# Patient Record
(demographics unavailable — no encounter records)

---

## 2024-12-24 NOTE — ASSESSMENT
[Verbal] : Verbal [Demo] : Demo [Patient] : Patient [Caregiver] : Caregiver [Good - alert, interested, motivated] : Good - alert, interested, motivated [Verbalizes knowledge/Understanding] : Verbalizes knowledge/understanding [Dressing changes] : dressing changes [Foot Care] : foot care [Skin Care] : skin care [Pressure relief] : pressure relief [Signs and symptoms of infection] : sign and symptoms of infection [Nutrition] : nutrition [How and When to Call] : how and when to call [Pain Management] : pain management [Patient responsibility to plan of care] : patient responsibility to plan of care [] : Yes [Stable] : stable [LTC] : LTC [Wheelchair] : Wheelchair [Faxed - Long Term Care/Home Health Agency] : Long Term Care/Home Health Agency: Faxed [FreeTextEntry2] : Infection prevention Localized wound care  Goal remaining pain free regarding wounds Pressure relief  Promote optimal skin care and nutrition.  [FreeTextEntry4] : Pt to continue to use heel booties.  Instructions given to accompanied caregiver   follow up in 4 weeks

## 2024-12-24 NOTE — VITALS
[Pain related to present condition?] : The patient's  pain is not related to present condition. [] : No [de-identified] : Patient complains of no pain.

## 2024-12-24 NOTE — PHYSICAL EXAM
[4 x 4] : 4 x 4  [0] : left 0 [1+] : left 1+ [Ankle Swelling (On Exam)] : present [Ankle Swelling Bilaterally] : bilaterally  [Ankle Swelling On The Left] : moderate [Varicose Veins Of Lower Extremities] : bilaterally [Ankle Swelling On The Right] : mild [Skin Ulcer] : ulcer [Calm] : calm [] : not present [Purpura] : no purpura  [Petechiae] : no petechiae [Alert] : not alert [Oriented to Person] : disoriented to person [Oriented to Place] : disoriented to place [Oriented to Time] : disoriented to time [de-identified] : calm , accompanied by aid ,  [de-identified] : TRISTIAN [de-identified] : patient non ambulatory , contracted, range of motion deferred at this time to the ankle joint [de-identified] : Bilateral heel ulcers healed [de-identified] : Dementia [FreeTextEntry1] : left plantar heel  [FreeTextEntry2] : 1.0 [FreeTextEntry3] : 1.0 [FreeTextEntry4] : 0.1 [de-identified] : Callus  [de-identified] : Silver Alginate [FreeTextEntry7] : Right lateral heel - closed  [de-identified] : Mechanically cleansed with sterile gauze and normal saline. Martin joseph   [de-identified] : Mechanically cleansed with sterile gauze and normal saline. White sock  [de-identified] : right buttocks (Pressure) - SEE MD NOTE [TWNoteComboBox4] : None [TWNoteComboBox5] : No [TWNoteComboBox6] : Pressure [de-identified] : No [de-identified] : other [de-identified] : None [de-identified] : None [de-identified] : 100% [de-identified] : No [de-identified] : 3x Weekly [de-identified] : Primary Dressing [de-identified] : 1 [de-identified] : None [de-identified] : No [de-identified] : Pressure [de-identified] : No [de-identified] : Erythema [de-identified] : None [de-identified] : False

## 2024-12-24 NOTE — REVIEW OF SYSTEMS
[Joint Stiffness] : joint stiffness [Skin Wound] : skin wound [Limb Weakness] : limb weakness [Difficulty Walking] : difficulty walking [Negative] : Psychiatric [Fever] : no fever [Chills] : no chills [Eye Pain] : no eye pain [Shortness Of Breath] : no shortness of breath [Abdominal Pain] : no abdominal pain [FreeTextEntry4] : Dementia  [FreeTextEntry5] : TRISTIAN [de-identified] : Pressure ulcer medial left heel and lateral right heel  , no soi , right closed , left is much smaller  [de-identified] : Dementia , patient non ambulatory  [de-identified] : Dementia

## 2024-12-24 NOTE — ASSESSMENT
[Verbal] : Verbal [Demo] : Demo [Patient] : Patient [Caregiver] : Caregiver [Good - alert, interested, motivated] : Good - alert, interested, motivated [Verbalizes knowledge/Understanding] : Verbalizes knowledge/understanding [Dressing changes] : dressing changes [Foot Care] : foot care [Skin Care] : skin care [Pressure relief] : pressure relief [Signs and symptoms of infection] : sign and symptoms of infection [Nutrition] : nutrition [How and When to Call] : how and when to call [Pain Management] : pain management [Patient responsibility to plan of care] : patient responsibility to plan of care [] : Yes [Stable] : stable [LTC] : LTC [Wheelchair] : Wheelchair [Not Applicable - Long Term Care/Home Health Agency] : Long Term Care/Home Health Agency: Not Applicable [FreeTextEntry2] : Infection prevention Localized wound care  Goal remaining pain free regarding wounds Pressure relief  Promote optimal skin care and nutrition.  [FreeTextEntry4] : Pt to continue to use heel booties.  Instructions given to accompanied caregiver   follow up in 2 weeks   Patient unable to perform own dressing changes and has limited mobility. Patient unable to drive to [facility]. Patient is wheelchair dependent, leaving home is a taxing effort. If patient is unable to perform dressing changes, it can lead to increased risk of infection. Patient has limited assistance for dressings at assisted living facility.

## 2024-12-24 NOTE — VITALS
I have reviewed discharge instructions with the patient. The patient verbalized understanding.   Patient armband removed and shredded [Pain related to present condition?] : The patient's  pain is related to present condition. [] : No [de-identified] : Patient complains of ankle pain. [FreeTextEntry3] : Left ankle [FreeTextEntry2] : Movement, Pressure [FreeTextEntry1] : Offloading, Keeping it still. [FreeTextEntry4] : Ankle supported with heel boot

## 2024-12-24 NOTE — PHYSICAL EXAM
[Normal Thyroid] : the thyroid was normal [Normal Breath Sounds] : Normal breath sounds [Normal Heart Sounds] : normal heart sounds [Normal Rate and Rhythm] : normal rate and rhythm [Alert] : alert [Calm] : calm [de-identified] : right buttocks (2 Wounds within measurements) [de-identified] : 5.2 [de-identified] : 4.9 [de-identified] : 0.1 [de-identified] : excoriation  [de-identified] : allevyn Ag pad [de-identified] : Mechanically cleansed with sterile gauze and normal saline. Cloth tape  [de-identified] : 2 [de-identified] : Moderate [de-identified] : No [de-identified] : Pressure [de-identified] : No [de-identified] : Erythema [de-identified] : None [de-identified] : None [de-identified] : 100% [de-identified] : No [de-identified] : 3x Weekly [de-identified] : No [de-identified] : None [de-identified] : 100% [de-identified] : 3x Weekly [JVD] : no jugular venous distention  [Abdomen Masses] : No abdominal massess [Abdomen Tenderness] : ~T ~M No abdominal tenderness [Tender] : nontender [Enlarged] : not enlarged [de-identified] : elderly WF, NAD, alert, awake [FreeTextEntry1] : right buttocks (2 Wounds within measurements) [FreeTextEntry2] : 5.2 [FreeTextEntry4] : 0.1 [FreeTextEntry3] : 4.9 [de-identified] : excoriation  [de-identified] : allevyn  pad [de-identified] : Mechanically cleansed with sterile gauze and normal saline. Cloth tape  [FreeTextEntry7] : Left buttocks  [FreeTextEntry8] : 4.7 [FreeTextEntry9] : 4.2 [de-identified] : Excoriated [de-identified] : excoriation  [de-identified] : allevyn  pad [de-identified] :  Mechanically cleansed with sterile gauze and normal saline 0.9%.  Cloth tape [TWNoteComboBox2] : 2 [TWNoteComboBox4] : Moderate [TWNoteComboBox5] : No [TWNoteComboBox6] : Pressure [de-identified] : No [de-identified] : Erythema [de-identified] : None [de-identified] : None [de-identified] : 100% [de-identified] : No [de-identified] : 3x Weekly [de-identified] : False [de-identified] : 2 [de-identified] : Moderate [de-identified] : No [de-identified] : Pressure [de-identified] : No [de-identified] : Erythema [de-identified] : None [de-identified] : None [de-identified] : 100% [de-identified] : No [de-identified] : 3x Weekly [de-identified] : Primary Dressing [de-identified] : Primary Dressing

## 2024-12-24 NOTE — VITALS
[Pain related to present condition?] : The patient's  pain is related to present condition. [] : No [de-identified] : Patient complains of ankle pain. [FreeTextEntry3] : Left ankle [FreeTextEntry1] : Offloading, Keeping it still. [FreeTextEntry2] : Movement, Pressure [FreeTextEntry4] : Ankle supported with heel boot

## 2024-12-24 NOTE — ASSESSMENT
[Verbal] : Verbal [Demo] : Demo [Patient] : Patient [Caregiver] : Caregiver [Good - alert, interested, motivated] : Good - alert, interested, motivated [Verbalizes knowledge/Understanding] : Verbalizes knowledge/understanding [Dressing changes] : dressing changes [Foot Care] : foot care [Skin Care] : skin care [Pressure relief] : pressure relief [Signs and symptoms of infection] : sign and symptoms of infection [Nutrition] : nutrition [How and When to Call] : how and when to call [Pain Management] : pain management [Patient responsibility to plan of care] : patient responsibility to plan of care [] : Yes [Stable] : stable [LTC] : LTC [Wheelchair] : Wheelchair [Not Applicable - Long Term Care/Home Health Agency] : Long Term Care/Home Health Agency: Not Applicable [FreeTextEntry2] : Infection prevention Localized wound care  Goal remaining pain free regarding wounds Pressure relief  Promote optimal skin care and nutrition.  [FreeTextEntry4] : Pt to continue to use heel booties.  Instructions given to accompanied caregiver   follow up in 2 weeks   Patient unable to perform own dressing changes and has limited mobility. Patient unable to drive to [facility]. Patient is wheelchair dependent, leaving home is a taxing effort. If patient is unable to perform dressing changes, it can lead to increased risk of infection. Patient has limited assistance for dressings at assisted living facility.

## 2024-12-24 NOTE — VITALS
[Pain related to present condition?] : The patient's  pain is not related to present condition. [] : No [de-identified] : Patient complains of no pain.

## 2024-12-24 NOTE — REVIEW OF SYSTEMS
[Joint Stiffness] : joint stiffness [Limb Weakness] : limb weakness [Difficulty Walking] : difficulty walking [Negative] : Endocrine [Fever] : no fever [Chills] : no chills [Eye Pain] : no eye pain [Shortness Of Breath] : no shortness of breath [Abdominal Pain] : no abdominal pain [Easy Bleeding] : no tendency for easy bleeding [FreeTextEntry4] : Dementia  [FreeTextEntry5] : TRISTIAN [de-identified] : Pressure ulcer medial left heel and lateral right heel ( closed )   , no soi , smaller and clean  [de-identified] : Dementia , patient non ambulatory  [de-identified] : Dementia

## 2024-12-24 NOTE — PLAN
[FreeTextEntry1] : offload; frequent rotation/change of position cristi whitlock DD qod f/u 1 month  time spent 25 mins.

## 2024-12-24 NOTE — REVIEW OF SYSTEMS
[Joint Stiffness] : joint stiffness [Limb Weakness] : limb weakness [Difficulty Walking] : difficulty walking [Negative] : Endocrine [Fever] : no fever [Chills] : no chills [Eye Pain] : no eye pain [Shortness Of Breath] : no shortness of breath [Abdominal Pain] : no abdominal pain [Easy Bleeding] : no tendency for easy bleeding [FreeTextEntry4] : Dementia  [FreeTextEntry5] : TRISTIAN [de-identified] : Pressure ulcer medial left heel and lateral right heel ( closed )   , no soi , smaller and clean  [de-identified] : Dementia , patient non ambulatory  [de-identified] : Dementia

## 2024-12-24 NOTE — HISTORY OF PRESENT ILLNESS
[FreeTextEntry1] : Right heel ulcer remains closed.  Patient has not followed up with orthopedic since discharge from the hospital.

## 2024-12-24 NOTE — PHYSICAL EXAM
[4 x 4] : 4 x 4  [0] : left 0 [1+] : left 1+ [Ankle Swelling (On Exam)] : present [Ankle Swelling Bilaterally] : bilaterally  [Ankle Swelling On The Left] : moderate [Varicose Veins Of Lower Extremities] : bilaterally [Ankle Swelling On The Right] : mild [Skin Ulcer] : ulcer [Calm] : calm [] : not present [Purpura] : no purpura  [Petechiae] : no petechiae [Alert] : not alert [Oriented to Person] : disoriented to person [Oriented to Place] : disoriented to place [Oriented to Time] : disoriented to time [de-identified] : calm , accompanied by aid ,  [de-identified] : TRISTIAN [de-identified] : patient non ambulatory , contracted, range of motion deferred at this time to the ankle joint [de-identified] : Bilateral heel ulcers healed [de-identified] : Dementia [FreeTextEntry1] : left plantar heel  [FreeTextEntry2] : 1.0 [FreeTextEntry3] : 1.0 [FreeTextEntry4] : 0.1 [de-identified] : Callus  [de-identified] : Silver Alginate [FreeTextEntry7] : Right lateral heel - closed  [de-identified] : Mechanically cleansed with sterile gauze and normal saline. Martin joseph   [de-identified] : Mechanically cleansed with sterile gauze and normal saline. White sock  [de-identified] : right buttocks (Pressure) - SEE MD NOTE [TWNoteComboBox4] : None [TWNoteComboBox5] : No [TWNoteComboBox6] : Pressure [de-identified] : No [de-identified] : other [de-identified] : None [de-identified] : None [de-identified] : 100% [de-identified] : No [de-identified] : 3x Weekly [de-identified] : Primary Dressing [de-identified] : 1 [de-identified] : None [de-identified] : No [de-identified] : Pressure [de-identified] : No [de-identified] : Erythema [de-identified] : None [de-identified] : False

## 2024-12-24 NOTE — PHYSICAL EXAM
[Normal Thyroid] : the thyroid was normal [Normal Breath Sounds] : Normal breath sounds [Normal Heart Sounds] : normal heart sounds [Normal Rate and Rhythm] : normal rate and rhythm [Alert] : alert [Calm] : calm [de-identified] : right buttocks (2 Wounds within measurements) [de-identified] : 5.2 [de-identified] : 4.9 [de-identified] : 0.1 [de-identified] : excoriation  [de-identified] : allevyn Ag pad [de-identified] : Mechanically cleansed with sterile gauze and normal saline. Cloth tape  [de-identified] : 2 [de-identified] : Moderate [de-identified] : No [de-identified] : Pressure [de-identified] : No [de-identified] : None [de-identified] : Erythema [de-identified] : None [de-identified] : 100% [de-identified] : No [de-identified] : 3x Weekly [de-identified] : No [de-identified] : None [de-identified] : 100% [de-identified] : 3x Weekly [JVD] : no jugular venous distention  [Abdomen Masses] : No abdominal massess [Abdomen Tenderness] : ~T ~M No abdominal tenderness [Tender] : nontender [Enlarged] : not enlarged [de-identified] : elderly WF, NAD, alert, awake [FreeTextEntry1] : right buttocks (2 Wounds within measurements) [FreeTextEntry2] : 5.2 [FreeTextEntry4] : 0.1 [FreeTextEntry3] : 4.9 [de-identified] : excoriation  [de-identified] : allevyn  pad [de-identified] : Mechanically cleansed with sterile gauze and normal saline. Cloth tape  [FreeTextEntry7] : Left buttocks  [FreeTextEntry8] : 4.7 [FreeTextEntry9] : 4.2 [de-identified] : Excoriated [de-identified] : excoriation  [de-identified] : allevyn  pad [de-identified] :  Mechanically cleansed with sterile gauze and normal saline 0.9%.  Cloth tape [TWNoteComboBox2] : 2 [TWNoteComboBox4] : Moderate [TWNoteComboBox5] : No [TWNoteComboBox6] : Pressure [de-identified] : No [de-identified] : Erythema [de-identified] : None [de-identified] : None [de-identified] : 100% [de-identified] : No [de-identified] : 3x Weekly [de-identified] : False [de-identified] : 2 [de-identified] : Moderate [de-identified] : No [de-identified] : Pressure [de-identified] : No [de-identified] : Erythema [de-identified] : None [de-identified] : None [de-identified] : 100% [de-identified] : No [de-identified] : 3x Weekly [de-identified] : Primary Dressing [de-identified] : Primary Dressing

## 2024-12-24 NOTE — REVIEW OF SYSTEMS
[Joint Stiffness] : joint stiffness [Skin Wound] : skin wound [Limb Weakness] : limb weakness [Difficulty Walking] : difficulty walking [Negative] : Psychiatric [Fever] : no fever [Chills] : no chills [Eye Pain] : no eye pain [Shortness Of Breath] : no shortness of breath [Abdominal Pain] : no abdominal pain [FreeTextEntry4] : Dementia  [FreeTextEntry5] : TRISTIAN [de-identified] : Pressure ulcer medial left heel and lateral right heel  , no soi , right closed , left is much smaller  [de-identified] : Dementia , patient non ambulatory  [de-identified] : Dementia

## 2024-12-24 NOTE — PLAN
[FreeTextEntry1] : Patient examined and evaluated this time.  Patient to follow-up with orthopedic surgery for care of left ankle fracture.  All questions from family member answered to satisfaction and patient's family member verbalized understanding.  Spent 20 minutes on patient care and medical decision making.

## 2024-12-24 NOTE — PHYSICAL EXAM
[Normal Thyroid] : the thyroid was normal [Normal Breath Sounds] : Normal breath sounds [Normal Heart Sounds] : normal heart sounds [Normal Rate and Rhythm] : normal rate and rhythm [Alert] : alert [Calm] : calm [de-identified] : right buttocks (2 Wounds within measurements) [de-identified] : 5.2 [de-identified] : 4.9 [de-identified] : 0.1 [de-identified] : excoriation  [de-identified] : allevyn Ag pad [de-identified] : Mechanically cleansed with sterile gauze and normal saline. Cloth tape  [de-identified] : 2 [de-identified] : Moderate [de-identified] : No [de-identified] : Pressure [de-identified] : No [de-identified] : Erythema [de-identified] : None [de-identified] : None [de-identified] : 100% [de-identified] : No [de-identified] : 3x Weekly [de-identified] : No [de-identified] : None [de-identified] : 100% [de-identified] : 3x Weekly [JVD] : no jugular venous distention  [Abdomen Masses] : No abdominal massess [Abdomen Tenderness] : ~T ~M No abdominal tenderness [Tender] : nontender [Enlarged] : not enlarged [de-identified] : elderly WF, NAD, alert, awake [FreeTextEntry1] : right buttocks (2 Wounds within measurements) [FreeTextEntry2] : 5.2 [FreeTextEntry3] : 4.9 [FreeTextEntry4] : 0.1 [de-identified] : excoriation  [de-identified] : allevyn  pad [de-identified] : Mechanically cleansed with sterile gauze and normal saline. Cloth tape  [FreeTextEntry7] : Left buttocks  [FreeTextEntry8] : 4.7 [FreeTextEntry9] : 4.2 [de-identified] : Excoriated [de-identified] : excoriation  [de-identified] : allevyn  pad [de-identified] :  Mechanically cleansed with sterile gauze and normal saline 0.9%.  Cloth tape [TWNoteComboBox2] : 2 [TWNoteComboBox4] : Moderate [TWNoteComboBox5] : No [TWNoteComboBox6] : Pressure [de-identified] : No [de-identified] : Erythema [de-identified] : None [de-identified] : None [de-identified] : No [de-identified] : 100% [de-identified] : 3x Weekly [de-identified] : False [de-identified] : 2 [de-identified] : Moderate [de-identified] : No [de-identified] : Pressure [de-identified] : No [de-identified] : Erythema [de-identified] : None [de-identified] : None [de-identified] : 100% [de-identified] : No [de-identified] : 3x Weekly [de-identified] : Primary Dressing [de-identified] : Primary Dressing

## 2024-12-24 NOTE — HISTORY OF PRESENT ILLNESS
[FreeTextEntry1] : 92 yo WF, here for f/u of bilateral buttock pressure ulcers. Only 2 small superficial right buttock ulcers present with partial dermis loss. I discussed the importance of  offload/frequent rotation/change of position throughout the day/night to the family members.      Also seen by podiatry, Dr. Tavera for her feet ulcers.

## 2024-12-24 NOTE — REVIEW OF SYSTEMS
[Joint Stiffness] : joint stiffness [Skin Wound] : skin wound [Limb Weakness] : limb weakness [Difficulty Walking] : difficulty walking [Negative] : Psychiatric [Fever] : no fever [Chills] : no chills [Eye Pain] : no eye pain [Shortness Of Breath] : no shortness of breath [Abdominal Pain] : no abdominal pain [FreeTextEntry4] : Dementia  [FreeTextEntry5] : TRISTIAN [de-identified] : Pressure ulcer medial left heel and lateral right heel  , no soi , right closed , left is much smaller  [de-identified] : Dementia , patient non ambulatory  [de-identified] : Dementia

## 2024-12-24 NOTE — ASSESSMENT
[Verbal] : Verbal [Demo] : Demo [Patient] : Patient [Caregiver] : Caregiver [Good - alert, interested, motivated] : Good - alert, interested, motivated [Verbalizes knowledge/Understanding] : Verbalizes knowledge/understanding [Dressing changes] : dressing changes [Foot Care] : foot care [Skin Care] : skin care [Pressure relief] : pressure relief [Signs and symptoms of infection] : sign and symptoms of infection [Nutrition] : nutrition [How and When to Call] : how and when to call [Pain Management] : pain management [Patient responsibility to plan of care] : patient responsibility to plan of care [] : Yes [Stable] : stable [LTC] : LTC [Wheelchair] : Wheelchair [Not Applicable - Long Term Care/Home Health Agency] : Long Term Care/Home Health Agency: Not Applicable [FreeTextEntry2] : Infection prevention Localized wound care  Goal remaining pain free regarding wounds Pressure relief  Promote optimal skin care and nutrition.  [FreeTextEntry4] : Pt to continue to use heel booties.  Instructions given to accompanied caregiver   follow up in 2 weeks   Patient unable to perform own dressing changes and has limited mobility. Patient unable to drive to [facility]. Patient is wheelchair dependent, leaving home is a taxing effort. If patient is unable to perform dressing changes, it can lead to increased risk of infection. Patient has limited assistance for dressings at assisted living facility.

## 2024-12-24 NOTE — HISTORY OF PRESENT ILLNESS
[FreeTextEntry1] : 94 yo WF, here for f/u of bilateral buttock pressure ulcers. Only 2 small superficial right buttock ulcers present with partial dermis loss. I discussed the importance of  offload/frequent rotation/change of position throughout the day/night to the family members.      Also seen by podiatry, Dr. Tavera for her feet ulcers.

## 2024-12-24 NOTE — VITALS
[Pain related to present condition?] : The patient's  pain is not related to present condition. [] : No [de-identified] : Patient complains of no pain.

## 2025-01-07 NOTE — PHYSICAL EXAM
[Left] : left foot and ankle [Mild] : mild diffused ankle swelling [1+] : dorsalis pedis pulse: 1+ [] : no erythema [FreeTextEntry8] : no point ttp [de-identified] : grossly moving foot

## 2025-01-07 NOTE — HISTORY OF PRESENT ILLNESS
[de-identified] : 01/07/2025: fall at nursing facility 3 months ago. has been treated in cast. here for second opinion. cast removed 2 weeks ago. has been treated at wound care for foot ulcers. has been nwb in . no pain currently. no prior ankle probs. denies dm/tob. has been wheelchair bound for last 6 months [] : This patient has had an injection before: no [FreeTextEntry1] : L ankle

## 2025-01-07 NOTE — DATA REVIEWED
[Outside X-rays] : outside x-rays [Left] : left [Ankle] : ankle [I reviewed the films/CD and additionally noted] : I reviewed the films/CD and additionally noted [FreeTextEntry1] : reviewed progression of fx healing -- sohail

## 2025-01-17 NOTE — VITALS
[Pain related to present condition?] : The patient's  pain is not related to present condition. [] : No [de-identified] : Patient complains of no pain.

## 2025-01-17 NOTE — PHYSICAL EXAM
[Normal Breath Sounds] : Normal breath sounds [Normal Heart Sounds] : normal heart sounds [2+] : left 2+ [0] : left 0 [Ankle Swelling Bilaterally] : bilaterally  [Ankle Swelling On The Right] : mild [Alert] : alert [Calm] : calm [de-identified] : Well-developed, Well-nourished in no acute distress. [de-identified] : Within normal limits. [de-identified] : Within normal limits. [de-identified] : Right buttock ulcer is closed.  [FreeTextEntry1] : right buttocks (2 Wounds within measurements) [FreeTextEntry2] : 5.2 [FreeTextEntry3] : 4.9 [FreeTextEntry4] : 0.1 [de-identified] : excoriation  [de-identified] : allevyn  pad [de-identified] : Mechanically cleansed with sterile gauze and normal saline. Cloth tape  [FreeTextEntry7] : Left buttocks  [FreeTextEntry8] : 4.7 [FreeTextEntry9] : 4.2 [de-identified] : Excoriated [de-identified] : excoriation  [de-identified] : DSOSMEL [de-identified] :  Mechanically cleansed with sterile gauze and normal saline 0.9%.  Cloth tape [TWNoteComboBox2] : 2 [TWNoteComboBox4] : Moderate [TWNoteComboBox5] : No [TWNoteComboBox6] : Pressure [de-identified] : No [de-identified] : Erythema [de-identified] : None [de-identified] : None [de-identified] : 100% [de-identified] : No [de-identified] : 3x Weekly [de-identified] : 2 [de-identified] : Moderate [de-identified] : No [de-identified] : Pressure [de-identified] : No [de-identified] : Erythema [de-identified] : None [de-identified] : None [de-identified] : 100% [de-identified] : No [de-identified] : 3x Weekly

## 2025-01-17 NOTE — VITALS
[Pain related to present condition?] : The patient's  pain is not related to present condition. [] : No [de-identified] : Patient complains of no pain.

## 2025-01-17 NOTE — ASSESSMENT
[Verbal] : Verbal [Demo] : Demo [Patient] : Patient [Caregiver] : Caregiver [Good - alert, interested, motivated] : Good - alert, interested, motivated [Verbalizes knowledge/Understanding] : Verbalizes knowledge/understanding [Dressing changes] : dressing changes [Foot Care] : foot care [Skin Care] : skin care [Pressure relief] : pressure relief [Signs and symptoms of infection] : sign and symptoms of infection [Nutrition] : nutrition [How and When to Call] : how and when to call [Pain Management] : pain management [Patient responsibility to plan of care] : patient responsibility to plan of care [] : Yes [Stable] : stable [LTC] : LTC [Wheelchair] : Wheelchair [Not Applicable - Long Term Care/Home Health Agency] : Long Term Care/Home Health Agency: Not Applicable [FreeTextEntry2] : Infection prevention Localized wound care  Goal remaining pain free regarding wounds Pressure relief  Promote optimal skin care and nutrition.  [FreeTextEntry4] : Pt to continue to use heel booties.  Instructions given to accompanied caregiver   follow up in 2 weeks  [FreeTextEntry1] : Right buttock ulcer is closed.

## 2025-01-17 NOTE — PLAN
[FreeTextEntry1] : Triad, Allevyn Border, return to office in four weeks. 25 minutes spent for patient care and medical decision making.

## 2025-01-17 NOTE — PHYSICAL EXAM
[Normal Breath Sounds] : Normal breath sounds [Normal Heart Sounds] : normal heart sounds [2+] : left 2+ [0] : left 0 [Ankle Swelling Bilaterally] : bilaterally  [Ankle Swelling On The Right] : mild [Alert] : alert [Calm] : calm [de-identified] : Well-developed, Well-nourished in no acute distress. [de-identified] : Within normal limits. [de-identified] : Within normal limits. [de-identified] : Right buttock ulcer is closed.  [FreeTextEntry1] : right buttocks (2 Wounds within measurements) [FreeTextEntry2] : 5.2 [FreeTextEntry3] : 4.9 [FreeTextEntry4] : 0.1 [de-identified] : excoriation  [de-identified] : allevyn  pad [de-identified] : Mechanically cleansed with sterile gauze and normal saline. Cloth tape  [FreeTextEntry7] : Left buttocks  [FreeTextEntry8] : 4.7 [FreeTextEntry9] : 4.2 [de-identified] : Excoriated [de-identified] : excoriation  [de-identified] : DSOSMEL [de-identified] :  Mechanically cleansed with sterile gauze and normal saline 0.9%.  Cloth tape [TWNoteComboBox2] : 2 [TWNoteComboBox4] : Moderate [TWNoteComboBox5] : No [TWNoteComboBox6] : Pressure [de-identified] : No [de-identified] : Erythema [de-identified] : None [de-identified] : None [de-identified] : 100% [de-identified] : No [de-identified] : 3x Weekly [de-identified] : 2 [de-identified] : Moderate [de-identified] : No [de-identified] : Pressure [de-identified] : No [de-identified] : Erythema [de-identified] : None [de-identified] : None [de-identified] : 100% [de-identified] : No [de-identified] : 3x Weekly